# Patient Record
Sex: FEMALE | Race: WHITE | NOT HISPANIC OR LATINO | URBAN - METROPOLITAN AREA
[De-identification: names, ages, dates, MRNs, and addresses within clinical notes are randomized per-mention and may not be internally consistent; named-entity substitution may affect disease eponyms.]

---

## 2017-08-14 ENCOUNTER — IMPORTED ENCOUNTER (OUTPATIENT)
Dept: URBAN - METROPOLITAN AREA CLINIC 50 | Facility: CLINIC | Age: 62
End: 2017-08-14

## 2017-08-16 ENCOUNTER — IMPORTED ENCOUNTER (OUTPATIENT)
Dept: URBAN - METROPOLITAN AREA CLINIC 50 | Facility: CLINIC | Age: 62
End: 2017-08-16

## 2018-08-22 ENCOUNTER — IMPORTED ENCOUNTER (OUTPATIENT)
Dept: URBAN - METROPOLITAN AREA CLINIC 50 | Facility: CLINIC | Age: 63
End: 2018-08-22

## 2019-09-17 ENCOUNTER — IMPORTED ENCOUNTER (OUTPATIENT)
Dept: URBAN - METROPOLITAN AREA CLINIC 50 | Facility: CLINIC | Age: 64
End: 2019-09-17

## 2019-09-17 NOTE — PATIENT DISCUSSION
"""Regarding complaint of flashing light only while standing in her bathroom at the mirror: Suspect ""

## 2020-09-22 ENCOUNTER — IMPORTED ENCOUNTER (OUTPATIENT)
Dept: URBAN - METROPOLITAN AREA CLINIC 50 | Facility: CLINIC | Age: 65
End: 2020-09-22

## 2020-09-23 ENCOUNTER — IMPORTED ENCOUNTER (OUTPATIENT)
Dept: URBAN - METROPOLITAN AREA CLINIC 50 | Facility: CLINIC | Age: 65
End: 2020-09-23

## 2020-12-09 ENCOUNTER — IMPORTED ENCOUNTER (OUTPATIENT)
Dept: URBAN - METROPOLITAN AREA CLINIC 50 | Facility: CLINIC | Age: 65
End: 2020-12-09

## 2021-04-18 ASSESSMENT — VISUAL ACUITY
OS_OTHER: 20/20. 20/20.
OD_CC: 20/20-1
OS_CC: J1+@ 17 IN
OD_CC: J1+
OD_CC: J1+
OD_CC: J1+@ 17 IN
OS_OTHER: 20/25. 20/30.
OS_BAT: 20/25
OD_PH: @ 17 IN
OS_CC: 20/30+
OS_CC: J1+
OS_CC: 20/25-
OS_CC: J1+
OS_CC: 20/20
OD_CC: 20/25-
OD_CC: 20/25-
OS_CC: 20/25
OD_CC: 20/20-1
OS_CC: J1+
OS_CC: 20/20-1
OS_OTHER: 20/30.
OD_CC: 20/30
OS_BAT: 20/20
OS_PH: @ 16 IN
OD_CC: J1+

## 2021-04-18 ASSESSMENT — TONOMETRY
OD_IOP_MMHG: 14
OD_IOP_MMHG: 15
OS_IOP_MMHG: 14
OD_IOP_MMHG: 14
OD_IOP_MMHG: 14
OS_IOP_MMHG: 14

## 2021-09-21 ENCOUNTER — ROUTINE EXAM (OUTPATIENT)
Dept: URBAN - METROPOLITAN AREA CLINIC 53 | Facility: CLINIC | Age: 66
End: 2021-09-21

## 2021-09-21 DIAGNOSIS — H43.813: ICD-10-CM

## 2021-09-21 DIAGNOSIS — H26.492: ICD-10-CM

## 2021-09-21 DIAGNOSIS — H35.363: ICD-10-CM

## 2021-09-21 DIAGNOSIS — H10.13: ICD-10-CM

## 2021-09-21 DIAGNOSIS — Z01.00: ICD-10-CM

## 2021-09-21 DIAGNOSIS — Z96.1: ICD-10-CM

## 2021-09-21 PROCEDURE — 92014 COMPRE OPH EXAM EST PT 1/>: CPT

## 2021-09-21 PROCEDURE — 92015 DETERMINE REFRACTIVE STATE: CPT

## 2021-09-21 RX ORDER — OLOPATADINE HYDROCHLORIDE 2 MG/ML
1 SOLUTION OPHTHALMIC
Start: 2021-09-21

## 2021-09-21 ASSESSMENT — VISUAL ACUITY
OS_CC: 20/25
OS_SC: 20/50
OU_CC: J1+
OD_GLARE: 20/25
OD_SC: 20/40
OS_CC: J1+
OS_GLARE: 20/25
OU_CC: 20/20
OD_CC: 20/25
OD_CC: J1+
OU_SC: 20/40

## 2021-09-21 ASSESSMENT — KERATOMETRY
OS_K2POWER_DIOPTERS: 44.50
OS_AXISANGLE2_DEGREES: 21
OD_K2POWER_DIOPTERS: 45.25
OS_K1POWER_DIOPTERS: 43.00
OS_AXISANGLE_DEGREES: 111
OD_AXISANGLE_DEGREES: 007
OD_K1POWER_DIOPTERS: 43.00
OD_AXISANGLE2_DEGREES: 97

## 2021-09-21 ASSESSMENT — TONOMETRY
OD_IOP_MMHG: 16
OS_IOP_MMHG: 15

## 2021-09-21 NOTE — PATIENT DISCUSSION
Recommend Pataday, warm compresses, lid scrubs, and artificial tears (Refresh, Systane, Oasis, or Thera Tears).

## 2022-08-18 ENCOUNTER — ESTABLISHED PATIENT (OUTPATIENT)
Dept: URBAN - METROPOLITAN AREA CLINIC 49 | Facility: CLINIC | Age: 67
End: 2022-08-18

## 2022-08-18 DIAGNOSIS — H01.021: ICD-10-CM

## 2022-08-18 DIAGNOSIS — H16.223: ICD-10-CM

## 2022-08-18 DIAGNOSIS — H10.13: ICD-10-CM

## 2022-08-18 DIAGNOSIS — H01.024: ICD-10-CM

## 2022-08-18 PROCEDURE — 92012 INTRM OPH EXAM EST PATIENT: CPT

## 2022-08-18 RX ORDER — LOTEPREDNOL ETABONATE 5 MG/ML: 1 SUSPENSION/ DROPS OPHTHALMIC TWICE A DAY

## 2022-08-18 ASSESSMENT — KERATOMETRY
OS_AXISANGLE_DEGREES: 111
OS_AXISANGLE2_DEGREES: 21
OD_K1POWER_DIOPTERS: 43.00
OS_K2POWER_DIOPTERS: 44.50
OD_AXISANGLE2_DEGREES: 97
OD_AXISANGLE_DEGREES: 007
OD_K2POWER_DIOPTERS: 45.25
OS_K1POWER_DIOPTERS: 43.00

## 2022-08-18 ASSESSMENT — TONOMETRY
OS_IOP_MMHG: 16
OD_IOP_MMHG: 18

## 2022-08-18 ASSESSMENT — VISUAL ACUITY
OD_CC: 20/25-2
OS_CC: 20/20-1

## 2022-08-18 NOTE — PATIENT DISCUSSION
Start Lotemax BID for 2 weeks, then Qday for 2 weeks OS. Start cool compresses OU PRN. RTC as scheduled for f/u appointment.

## 2022-09-21 ENCOUNTER — COMPREHENSIVE EXAM (OUTPATIENT)
Dept: URBAN - METROPOLITAN AREA CLINIC 53 | Facility: CLINIC | Age: 67
End: 2022-09-21

## 2022-09-21 DIAGNOSIS — Z01.01: ICD-10-CM

## 2022-09-21 DIAGNOSIS — H01.024: ICD-10-CM

## 2022-09-21 DIAGNOSIS — H26.492: ICD-10-CM

## 2022-09-21 DIAGNOSIS — H01.021: ICD-10-CM

## 2022-09-21 DIAGNOSIS — H10.13: ICD-10-CM

## 2022-09-21 DIAGNOSIS — H40.013: ICD-10-CM

## 2022-09-21 DIAGNOSIS — H43.813: ICD-10-CM

## 2022-09-21 DIAGNOSIS — H35.363: ICD-10-CM

## 2022-09-21 DIAGNOSIS — H16.223: ICD-10-CM

## 2022-09-21 PROCEDURE — 92015 DETERMINE REFRACTIVE STATE: CPT

## 2022-09-21 PROCEDURE — 92014 COMPRE OPH EXAM EST PT 1/>: CPT

## 2022-09-21 ASSESSMENT — KERATOMETRY
OD_AXISANGLE_DEGREES: 7
OS_K1POWER_DIOPTERS: 44.50
OD_K1POWER_DIOPTERS: 43.50
OS_AXISANGLE_DEGREES: 141
OS_K2POWER_DIOPTERS: 45.25
OS_AXISANGLE2_DEGREES: 51
OD_K2POWER_DIOPTERS: 46.25
OD_AXISANGLE2_DEGREES: 97

## 2022-09-21 ASSESSMENT — VISUAL ACUITY
OS_CC: 20/20-1
OS_GLARE: 20/25
OS_GLARE: 20/30
OU_CC: J1+@16
OD_CC: 20/25-1

## 2022-09-21 ASSESSMENT — TONOMETRY
OD_IOP_MMHG: 15
OS_IOP_MMHG: 15

## 2022-09-21 NOTE — PATIENT DISCUSSION
GLAUCOMA SUSPECT-C/D: The patient is a glaucoma suspect because of suspicious appearance of the optic disc(s)OS>OD. IOP 15/15 WNL. (-) family hx. RTC in 4-5 months for IOP check/Pach with HVF 24-2/OCT RNFL.

## 2023-08-21 ENCOUNTER — COMPREHENSIVE EXAM (OUTPATIENT)
Dept: URBAN - METROPOLITAN AREA CLINIC 53 | Facility: CLINIC | Age: 68
End: 2023-08-21

## 2023-08-21 DIAGNOSIS — H52.4: ICD-10-CM

## 2023-08-21 DIAGNOSIS — H26.492: ICD-10-CM

## 2023-08-21 DIAGNOSIS — H16.223: ICD-10-CM

## 2023-08-21 DIAGNOSIS — H35.363: ICD-10-CM

## 2023-08-21 DIAGNOSIS — H43.813: ICD-10-CM

## 2023-08-21 DIAGNOSIS — H40.013: ICD-10-CM

## 2023-08-21 PROCEDURE — 92083 EXTENDED VISUAL FIELD XM: CPT

## 2023-08-21 PROCEDURE — 92015 DETERMINE REFRACTIVE STATE: CPT

## 2023-08-21 PROCEDURE — 92134 CPTRZ OPH DX IMG PST SGM RTA: CPT

## 2023-08-21 PROCEDURE — 92014 COMPRE OPH EXAM EST PT 1/>: CPT

## 2023-08-21 ASSESSMENT — KERATOMETRY
OD_AXISANGLE2_DEGREES: 97
OS_K1POWER_DIOPTERS: 44.50
OS_K2POWER_DIOPTERS: 45.25
OD_AXISANGLE_DEGREES: 7
OD_K2POWER_DIOPTERS: 46.25
OS_AXISANGLE_DEGREES: 141
OD_K1POWER_DIOPTERS: 43.50
OS_AXISANGLE2_DEGREES: 51

## 2023-08-21 ASSESSMENT — VISUAL ACUITY
OS_CC: 20/20
OD_GLARE: 20/30
OU_CC: J1+@15"
OS_GLARE: 20/25
OS_GLARE: 20/25
OD_GLARE: 20/25-2
OD_CC: 20/20-1

## 2023-08-21 ASSESSMENT — TONOMETRY
OD_IOP_MMHG: 18
OS_IOP_MMHG: 16
OD_IOP_MMHG: 17
OS_IOP_MMHG: 16

## 2024-02-21 ENCOUNTER — FOLLOW UP (OUTPATIENT)
Dept: URBAN - METROPOLITAN AREA CLINIC 53 | Facility: CLINIC | Age: 69
End: 2024-02-21

## 2024-02-21 DIAGNOSIS — H16.223: ICD-10-CM

## 2024-02-21 DIAGNOSIS — H40.013: ICD-10-CM

## 2024-02-21 PROCEDURE — 99213 OFFICE O/P EST LOW 20 MIN: CPT

## 2024-02-21 PROCEDURE — 92133 CPTRZD OPH DX IMG PST SGM ON: CPT

## 2024-02-21 PROCEDURE — 92083 EXTENDED VISUAL FIELD XM: CPT

## 2024-02-21 ASSESSMENT — VISUAL ACUITY
OS_CC: 20/20
OD_CC: 20/25

## 2024-02-21 ASSESSMENT — KERATOMETRY
OD_AXISANGLE2_DEGREES: 97
OS_AXISANGLE_DEGREES: 141
OS_AXISANGLE2_DEGREES: 51
OD_K1POWER_DIOPTERS: 43.50
OS_K1POWER_DIOPTERS: 44.50
OS_K2POWER_DIOPTERS: 45.25
OD_K2POWER_DIOPTERS: 46.25
OD_AXISANGLE_DEGREES: 7

## 2024-02-21 ASSESSMENT — TONOMETRY
OS_IOP_MMHG: 14
OD_IOP_MMHG: 16
OD_IOP_MMHG: 15
OS_IOP_MMHG: 14

## 2024-03-06 ENCOUNTER — ESTABLISHED PATIENT (OUTPATIENT)
Dept: URBAN - METROPOLITAN AREA CLINIC 53 | Facility: CLINIC | Age: 69
End: 2024-03-06

## 2024-03-06 DIAGNOSIS — H10.13: ICD-10-CM

## 2024-03-06 DIAGNOSIS — H16.223: ICD-10-CM

## 2024-03-06 DIAGNOSIS — H00.12: ICD-10-CM

## 2024-03-06 PROCEDURE — 99212 OFFICE O/P EST SF 10 MIN: CPT

## 2024-03-06 RX ORDER — OLOPATADINE HYDROCHLORIDE 2 MG/ML: 1 SOLUTION OPHTHALMIC

## 2024-03-06 ASSESSMENT — TONOMETRY
OD_IOP_MMHG: 16
OS_IOP_MMHG: 14
OD_IOP_MMHG: 15
OS_IOP_MMHG: 14

## 2024-03-06 ASSESSMENT — KERATOMETRY
OS_AXISANGLE2_DEGREES: 51
OS_AXISANGLE_DEGREES: 141
OD_K1POWER_DIOPTERS: 43.50
OD_K2POWER_DIOPTERS: 46.25
OS_K1POWER_DIOPTERS: 44.50
OD_AXISANGLE_DEGREES: 7
OD_AXISANGLE2_DEGREES: 97
OS_K2POWER_DIOPTERS: 45.25

## 2024-03-06 ASSESSMENT — VISUAL ACUITY
OS_CC: 20/20-1
OD_CC: 20/25+2

## 2024-08-23 ENCOUNTER — COMPREHENSIVE EXAM (OUTPATIENT)
Dept: URBAN - METROPOLITAN AREA CLINIC 53 | Facility: CLINIC | Age: 69
End: 2024-08-23

## 2024-08-23 DIAGNOSIS — H35.363: ICD-10-CM

## 2024-08-23 DIAGNOSIS — H52.4: ICD-10-CM

## 2024-08-23 DIAGNOSIS — Z01.01: ICD-10-CM

## 2024-08-23 DIAGNOSIS — H40.013: ICD-10-CM

## 2024-08-23 PROCEDURE — 92014 COMPRE OPH EXAM EST PT 1/>: CPT

## 2024-08-23 PROCEDURE — 92015 DETERMINE REFRACTIVE STATE: CPT

## 2024-08-23 ASSESSMENT — VISUAL ACUITY
OU_CC: 20/25
OD_CC: 20/30
OS_CC: 20/25
OU_CC: J1+ PUSH
OS_GLARE: 20/20
OS_GLARE: 20/20

## 2024-08-23 ASSESSMENT — TONOMETRY
OS_IOP_MMHG: 16
OD_IOP_MMHG: 18
OS_IOP_MMHG: 16
OD_IOP_MMHG: 17

## 2024-08-23 ASSESSMENT — KERATOMETRY
OD_AXISANGLE2_DEGREES: 96
OS_AXISANGLE_DEGREES: 114
OS_K1POWER_DIOPTERS: 43.25
OS_K2POWER_DIOPTERS: 44.25
OS_AXISANGLE2_DEGREES: 24
OD_K2POWER_DIOPTERS: 45.25
OD_AXISANGLE_DEGREES: 006
OD_K1POWER_DIOPTERS: 43.00

## 2025-02-25 ENCOUNTER — DIAGNOSTICS ONLY (OUTPATIENT)
Age: 70
End: 2025-02-25

## 2025-02-25 DIAGNOSIS — H40.013: ICD-10-CM

## 2025-02-25 PROCEDURE — 92083 EXTENDED VISUAL FIELD XM: CPT

## 2025-02-25 PROCEDURE — 92133 CPTRZD OPH DX IMG PST SGM ON: CPT

## 2025-03-11 ENCOUNTER — EMERGENCY VISIT (OUTPATIENT)
Age: 70
End: 2025-03-11

## 2025-03-11 DIAGNOSIS — H10.403: ICD-10-CM

## 2025-03-11 DIAGNOSIS — L71.9: ICD-10-CM

## 2025-03-11 PROCEDURE — 99214 OFFICE O/P EST MOD 30 MIN: CPT

## 2025-03-11 RX ORDER — BUTALBITAL, ASPIRIN, CAFFEINE AND CODEINE PHOSPHATE 30; 50; 40; 325 MG/1; MG/1; MG/1; MG/1
1 CAPSULE ORAL TWICE A DAY
Start: 2025-03-11

## 2025-03-11 RX ORDER — OLOPATADINE HYDROCHLORIDE 2 MG/ML
1 SOLUTION OPHTHALMIC EVERY MORNING
Start: 2025-03-11

## 2025-03-11 RX ORDER — TOBRAMYCIN / DEXAMETHASONE 3; .5 MG/ML; MG/ML
1 SUSPENSION/ DROPS OPHTHALMIC TWICE A DAY
Start: 2025-03-11

## 2025-03-25 ENCOUNTER — FOLLOW UP (OUTPATIENT)
Age: 70
End: 2025-03-25

## 2025-03-25 DIAGNOSIS — H10.403: ICD-10-CM

## 2025-03-25 DIAGNOSIS — L71.9: ICD-10-CM

## 2025-03-25 PROCEDURE — 99213 OFFICE O/P EST LOW 20 MIN: CPT

## 2025-08-26 ENCOUNTER — COMPREHENSIVE EXAM (OUTPATIENT)
Age: 70
End: 2025-08-26

## 2025-08-26 DIAGNOSIS — Z01.01: ICD-10-CM

## 2025-08-26 DIAGNOSIS — H52.4: ICD-10-CM

## 2025-08-26 DIAGNOSIS — H26.492: ICD-10-CM

## 2025-08-26 DIAGNOSIS — H16.223: ICD-10-CM

## 2025-08-26 DIAGNOSIS — H40.013: ICD-10-CM

## 2025-08-26 DIAGNOSIS — H43.813: ICD-10-CM

## 2025-08-26 PROCEDURE — 92015 DETERMINE REFRACTIVE STATE: CPT

## 2025-08-26 PROCEDURE — 92014 COMPRE OPH EXAM EST PT 1/>: CPT
